# Patient Record
Sex: FEMALE | Race: WHITE | Employment: PART TIME | URBAN - METROPOLITAN AREA
[De-identification: names, ages, dates, MRNs, and addresses within clinical notes are randomized per-mention and may not be internally consistent; named-entity substitution may affect disease eponyms.]

---

## 2018-01-24 ENCOUNTER — ANESTHESIA EVENT (OUTPATIENT)
Dept: ENDOSCOPY | Age: 43
End: 2018-01-24
Payer: MEDICAID

## 2018-01-24 ENCOUNTER — HOSPITAL ENCOUNTER (OUTPATIENT)
Age: 43
Setting detail: OUTPATIENT SURGERY
Discharge: HOME | End: 2018-01-24
Attending: INTERNAL MEDICINE | Admitting: INTERNAL MEDICINE
Payer: MEDICAID

## 2018-01-24 ENCOUNTER — ANESTHESIA (OUTPATIENT)
Dept: ENDOSCOPY | Age: 43
End: 2018-01-24
Payer: MEDICAID

## 2018-01-24 VITALS
SYSTOLIC BLOOD PRESSURE: 113 MMHG | WEIGHT: 122.13 LBS | DIASTOLIC BLOOD PRESSURE: 69 MMHG | TEMPERATURE: 98 F | RESPIRATION RATE: 11 BRPM | HEIGHT: 62 IN | HEART RATE: 55 BPM | OXYGEN SATURATION: 100 % | BODY MASS INDEX: 22.48 KG/M2

## 2018-01-24 LAB — HCG UR QL: NEGATIVE

## 2018-01-24 PROCEDURE — 88305 TISSUE EXAM BY PATHOLOGIST: CPT | Performed by: INTERNAL MEDICINE

## 2018-01-24 PROCEDURE — 74011250636 HC RX REV CODE- 250/636

## 2018-01-24 PROCEDURE — 76040000019: Performed by: INTERNAL MEDICINE

## 2018-01-24 PROCEDURE — 76060000031 HC ANESTHESIA FIRST 0.5 HR: Performed by: INTERNAL MEDICINE

## 2018-01-24 PROCEDURE — 81025 URINE PREGNANCY TEST: CPT

## 2018-01-24 PROCEDURE — 74011000250 HC RX REV CODE- 250

## 2018-01-24 PROCEDURE — 77030009426 HC FCPS BIOP ENDOSC BSC -B: Performed by: INTERNAL MEDICINE

## 2018-01-24 PROCEDURE — 77030027957 HC TBNG IRR ENDOGTR BUSS -B: Performed by: INTERNAL MEDICINE

## 2018-01-24 RX ORDER — SODIUM CHLORIDE 0.9 % (FLUSH) 0.9 %
5-10 SYRINGE (ML) INJECTION EVERY 8 HOURS
Status: DISCONTINUED | OUTPATIENT
Start: 2018-01-24 | End: 2018-01-24 | Stop reason: HOSPADM

## 2018-01-24 RX ORDER — DEXTROMETHORPHAN/PSEUDOEPHED 2.5-7.5/.8
1.2 DROPS ORAL
Status: DISCONTINUED | OUTPATIENT
Start: 2018-01-24 | End: 2018-01-24 | Stop reason: HOSPADM

## 2018-01-24 RX ORDER — FENTANYL CITRATE 50 UG/ML
100 INJECTION, SOLUTION INTRAMUSCULAR; INTRAVENOUS
Status: DISCONTINUED | OUTPATIENT
Start: 2018-01-24 | End: 2018-01-24 | Stop reason: HOSPADM

## 2018-01-24 RX ORDER — MIDAZOLAM HYDROCHLORIDE 1 MG/ML
.25-5 INJECTION, SOLUTION INTRAMUSCULAR; INTRAVENOUS
Status: DISCONTINUED | OUTPATIENT
Start: 2018-01-24 | End: 2018-01-24 | Stop reason: HOSPADM

## 2018-01-24 RX ORDER — SODIUM CHLORIDE 0.9 % (FLUSH) 0.9 %
5-10 SYRINGE (ML) INJECTION AS NEEDED
Status: DISCONTINUED | OUTPATIENT
Start: 2018-01-24 | End: 2018-01-24 | Stop reason: HOSPADM

## 2018-01-24 RX ORDER — EPINEPHRINE 0.1 MG/ML
1 INJECTION INTRACARDIAC; INTRAVENOUS
Status: DISCONTINUED | OUTPATIENT
Start: 2018-01-24 | End: 2018-01-24 | Stop reason: HOSPADM

## 2018-01-24 RX ORDER — ATROPINE SULFATE 0.1 MG/ML
0.5 INJECTION INTRAVENOUS
Status: DISCONTINUED | OUTPATIENT
Start: 2018-01-24 | End: 2018-01-24 | Stop reason: HOSPADM

## 2018-01-24 RX ORDER — SODIUM CHLORIDE 9 MG/ML
INJECTION, SOLUTION INTRAVENOUS
Status: DISCONTINUED | OUTPATIENT
Start: 2018-01-24 | End: 2018-01-24 | Stop reason: HOSPADM

## 2018-01-24 RX ORDER — NALOXONE HYDROCHLORIDE 0.4 MG/ML
0.4 INJECTION, SOLUTION INTRAMUSCULAR; INTRAVENOUS; SUBCUTANEOUS
Status: DISCONTINUED | OUTPATIENT
Start: 2018-01-24 | End: 2018-01-24 | Stop reason: HOSPADM

## 2018-01-24 RX ORDER — FLUMAZENIL 0.1 MG/ML
0.2 INJECTION INTRAVENOUS
Status: DISCONTINUED | OUTPATIENT
Start: 2018-01-24 | End: 2018-01-24 | Stop reason: HOSPADM

## 2018-01-24 RX ORDER — PROPOFOL 10 MG/ML
INJECTION, EMULSION INTRAVENOUS AS NEEDED
Status: DISCONTINUED | OUTPATIENT
Start: 2018-01-24 | End: 2018-01-24 | Stop reason: HOSPADM

## 2018-01-24 RX ORDER — LIDOCAINE HYDROCHLORIDE 20 MG/ML
INJECTION, SOLUTION EPIDURAL; INFILTRATION; INTRACAUDAL; PERINEURAL AS NEEDED
Status: DISCONTINUED | OUTPATIENT
Start: 2018-01-24 | End: 2018-01-24 | Stop reason: HOSPADM

## 2018-01-24 RX ORDER — SODIUM CHLORIDE 9 MG/ML
50 INJECTION, SOLUTION INTRAVENOUS CONTINUOUS
Status: DISCONTINUED | OUTPATIENT
Start: 2018-01-24 | End: 2018-01-24 | Stop reason: HOSPADM

## 2018-01-24 RX ORDER — LANOLIN ALCOHOL/MO/W.PET/CERES
CREAM (GRAM) TOPICAL
COMMUNITY

## 2018-01-24 RX ADMIN — PROPOFOL 20 MG: 10 INJECTION, EMULSION INTRAVENOUS at 08:48

## 2018-01-24 RX ADMIN — LIDOCAINE HYDROCHLORIDE 60 MG: 20 INJECTION, SOLUTION EPIDURAL; INFILTRATION; INTRACAUDAL; PERINEURAL at 08:43

## 2018-01-24 RX ADMIN — PROPOFOL 50 MG: 10 INJECTION, EMULSION INTRAVENOUS at 08:53

## 2018-01-24 RX ADMIN — PROPOFOL 20 MG: 10 INJECTION, EMULSION INTRAVENOUS at 08:51

## 2018-01-24 RX ADMIN — PROPOFOL 20 MG: 10 INJECTION, EMULSION INTRAVENOUS at 08:47

## 2018-01-24 RX ADMIN — PROPOFOL 20 MG: 10 INJECTION, EMULSION INTRAVENOUS at 09:03

## 2018-01-24 RX ADMIN — PROPOFOL 30 MG: 10 INJECTION, EMULSION INTRAVENOUS at 09:00

## 2018-01-24 RX ADMIN — PROPOFOL 50 MG: 10 INJECTION, EMULSION INTRAVENOUS at 08:46

## 2018-01-24 RX ADMIN — PROPOFOL 40 MG: 10 INJECTION, EMULSION INTRAVENOUS at 08:54

## 2018-01-24 RX ADMIN — PROPOFOL 40 MG: 10 INJECTION, EMULSION INTRAVENOUS at 09:07

## 2018-01-24 RX ADMIN — SODIUM CHLORIDE: 9 INJECTION, SOLUTION INTRAVENOUS at 08:31

## 2018-01-24 NOTE — ROUTINE PROCESS
Summer A Rochelle Muhammad  1975  [id number]    Situation:  Verbal report received from: Maylin Keller RN  Procedure: Procedure(s):  EGD/COLONOSCOPY  COLONOSCOPY  ESOPHAGOGASTRODUODENAL (EGD) BIOPSY    Background:    Preoperative diagnosis:  ANEMIA  Postoperative diagnosis: 1.- Normal EGD  2.- Normal Colonoscopy    :  Dr. Jagdeep Taylor  Assistant(s): Endoscopy Technician-1: Dee Dee Islas  Endoscopy RN-1: Srinivas Walker RN    Specimens:   ID Type Source Tests Collected by Time Destination   1 : Duodenal Biopsy  Preservative   Neha Myers MD 1/24/2018 2983 Pathology     H. Pylori  no    Assessment:  Intra-procedure medications       Anesthesia gave intra-procedure sedation and medications, see anesthesia flow sheet yes    Intravenous fluids: NS@ KVO     Vital signs stable     Abdominal assessment: round and soft     Recommendation:  Discharge patient per MD order  Family or Friend  Pt friend  Permission to share finding with family or friend yes

## 2018-01-24 NOTE — ANESTHESIA POSTPROCEDURE EVALUATION
Post-Anesthesia Evaluation and Assessment    Patient: Mark Prince MRN: [id number]  SSN: [id number]    YOB: 1975  Age: 40 y.o. Sex: female       Cardiovascular Function/Vital Signs  Visit Vitals    BP (!) 77/31    Pulse (!) 59    Temp 36.8 °C (98.2 °F)    Resp 22    Ht 5' 2\" (1.575 m)    Wt 55.4 kg (122 lb 2 oz)    SpO2 100%    Breastfeeding No    BMI 22.34 kg/m2       Patient is status post MAC anesthesia for Procedure(s):  EGD/COLONOSCOPY  COLONOSCOPY  ESOPHAGOGASTRODUODENAL (EGD) BIOPSY. Nausea/Vomiting: None    Postoperative hydration reviewed and adequate. Pain:  Pain Scale 1: Numeric (0 - 10) (01/24/18 5456)  Pain Intensity 1: 0 (01/24/18 7670)   Managed    Neurological Status: At baseline    Mental Status and Level of Consciousness: Arousable    Pulmonary Status:   O2 Device: Nasal cannula (01/24/18 0910)   Adequate oxygenation and airway patent    Complications related to anesthesia: None    Post-anesthesia assessment completed.  No concerns    Signed By: Misael Breaux MD     January 24, 2018

## 2018-01-24 NOTE — PROCEDURES
[address], [address] [place] [address]              :  Rogelio Izquierdo MD    Referring Provider: Not On File Bsi    Sedation:  MAC anesthesia Propofol        Prior to the procedure its objectives, risks, consequences and alternatives were discussed with the patient who then elected to proceed. The patient had the opportunity to ask questions and those questions were answered. A physical exam was performed. The heart, lungs, and mental status were examined prior to the procedure and found to be satisfactory for conscious sedation and for the procedure. Conscious sedation was initiated by the physician. Continuous pulse oximetry and blood pressure monitoring were used throughout the procedure. After appropriate analgesia and rectal exam, the colonoscope was passed into the anus and passed to the cecum without difficulty. The prep was good. On slow withdrawal of the scope, the cecum, ascending colon, hepatic flexure, transverse colon, splenic flexure, descending colon, sigmoid and rectum were normal. Retroflexion exam of the rectum was normal. She tolerated the procedure without complication and I recommend a repeat colonoscopy in 10 years. Specimen none    Complications: None. EBL:  None.     Rogelio Izquierdo MD  1/24/2018  9:20 AM

## 2018-01-24 NOTE — PROCEDURES
26[address] [facility]  [address]               : Jaciel Schuster MD    Referring Provider: Not On File Bsi    Sedation:  MAC anesthesia Propofol      Prior to the procedure its objectives, risks, consequences and alternatives were discussed with the patient who then elected to proceed. The patient had the opportunity to ask questions and those questions were answered. A physical exam was performed. The heart, lungs, and mental status were examined prior to the procedure and found to be satisfactory for conscious sedation and for the procedure. Conscious sedation was initiated by the physician. Continuous pulse oximetry and blood pressure monitoring were used throughout the procedure. After appropriate pharyngeal anesthesia, the endoscope was passed into the esophagus without difficulty. The proximal esophagus is normal as is the distal esophagus. The scope was passed into the stomach without difficulty. The fundus, body, antrum, pylorus, bulb and postbulbar area are unremarkable. Biopsies were obtained from the second portion of the duodenum to rule out celiac disease. On slow withdrawal of the scope, no abnormality was noted. Retroflexion revealed normal cardia and fundus. She tolerated the procedure without complications and will be discharged later today. Specimen Biopsies of duodenum    Complications: None. EBL:  None.     Jaciel Schuster MD  1/24/2018  9:18 AM

## 2018-01-24 NOTE — IP AVS SNAPSHOT
[address] 
[phone] Patient: Ray Tirado MRN: [id number] OIN:0/79/4277 About your hospitalization You were admitted on:  January 24, 2018 You last received care in the:  [facility] ENDOSCOPY You were discharged on:  January 24, 2018 Why you were hospitalized Your primary diagnosis was:  Not on File Follow-up Information None Discharge Orders None A check ana indicates which time of day the medication should be taken. My Medications CONTINUE taking these medications Instructions Each Dose to Equal  
 Morning Noon Evening Bedtime Iron 325 mg (65 mg iron) tablet Generic drug:  ferrous sulfate Your last dose was: Your next dose is: Take  by mouth Daily (before breakfast). Discharge Instructions Pretty Nicholson. 
       
   
   DISCHARGE INSTRUCTIONS Eliana Jeyson Arguetaler 
[id number] 1975 DISCOMFORT: 
Redness at IV site- apply warm compress to area; if redness or soreness persist- contact your physician There may be a slight amount of blood passed from the rectum Gaseous discomfort- walking, belching will help relieve any discomfort You may not operate a vehicle for 12 hours You may not  engage in an occupation involving machinery or appliances for rest of today You may not  drink alcoholic beverages for at least 12 hours Avoid making any critical decisions for at least 24 hour DIET: 
 High fiber diet.  however -  remember your colon is empty and a heavy meal will produce gas. Avoid these foods:  vegetables, fried / greasy foods, carbonated drinks for today ACTIVITY You may resume your normal daily activities Spend the remainder of the day resting -  avoid any strenuous activity. CALL M.D. ANY SIGN OF Increasing pain, nausea, vomiting Abdominal distension (swelling) New increased bleeding (oral or rectal) Fever (chills) Pain in chest area Bloody discharge from nose or mouth Shortness of breath ENDOSCOPY FINDINGS: 
 Your endoscopy and colonoscopy were normal. Biopsies were done to rule out celiac disease. You may not  take any Advil, Aspirin, Ibuprofen, Motrin, Aleve, or Goodys for 10 days, ONLY  Tylenol as needed for pain. Post procedure diagnosis:  1.- Normal EGD 
2.- Normal Colonoscopy Follow-up Instructions: 
 Call Dr. Megha Vieyra for any questions or problems. If we took a biopsy please call the office within 2 weeks to discuss your                             pathology results. Telephone # [phone] Introducing [facility] SERVICES! Tamara Haywood introduces [employer] patient portal. Now you can access parts of your medical record, email your doctor's office, and request medication refills online. 1. In your internet browser, go to [email or web address] 2. Click on the First Time User? Click Here link in the Sign In box. You will see the New Member Sign Up page. 3. Enter your [facility] Access Code exactly as it appears below. You will not need to use this code after youve completed the sign-up process. If you do not sign up before the expiration date, you must request a new code. · [facility] Access Code: [email or web address] Expires: 4/24/2018  9:23 AM 
 
4. Enter the last four digits of your Social Security Number (xxxx) and Date of Birth (mm/dd/yyyy) as indicated and click Submit. You will be taken to the next sign-up page. 5. Create a [facility] ID. This will be your [facility]mit login ID and cannot be changed, so think of one that is secure and easy to remember. 6. Create a [facility] password. You can change your password at any time. 7. Enter your Password Reset Question and Answer. This can be used at a later time if you forget your password. 8. Enter your e-mail address. You will receive e-mail notification when new information is available in [address] 9. Click Sign Up. You can now view and download portions of your medical record. 10. Click the Download Summary menu link to download a portable copy of your medical information. If you have questions, please visit the Frequently Asked Questions section of the [employer] website. Remember, [employer] is NOT to be used for urgent needs. For medical emergencies, dial 911. Now available from your iPhone and Android! Providers Seen During Your Hospitalization Provider Specialty Primary office phone Jeb Ortiz MD Gastroenterology [phone] Your Primary Care Physician (PCP) Primary Care Physician Office Phone Office Fax NOT ON FILE ** None ** ** None ** You are allergic to the following No active allergies Recent Documentation Height Weight Breastfeeding? BMI Smoking Status 1.575 m 55.4 kg No 22.34 kg/m2 Former Smoker Emergency Contacts Name Discharge Info Relation Home Work Mobile Deleta Librado DECLINED CAREGIVER [4] Boyfriend [17] [phone] Patient Belongings The following personal items are in your possession at time of discharge: 
  Dental Appliances: None  Visual Aid: Other (comment) Please provide this summary of care documentation to your next provider. Signatures-by signing, you are acknowledging that this After Visit Summary has been reviewed with you and you have received a copy. Patient Signature:  ____________________________________________________________ Date:  ____________________________________________________________  
  
Mary Singh Provider Signature:  ____________________________________________________________ Date:  ____________________________________________________________

## 2018-01-24 NOTE — IP AVS SNAPSHOT
Summary of Care Report The Summary of Care report has been created to help improve care coordination. Users with access to [employer] or [address] (Web-based application) may access additional patient information including the Discharge Summary. If you are not currently a [address] user and need more information, please call the number listed below in the [place] [address] section and ask to be connected with Medical Records. Facility Information Name Address Phone [address] [phone] [phone] Patient Information Patient Name Sex ZAKIYA De La Fuente ([id number]) Female 1975 Discharge Information Admitting Provider Service Area Unit Rogelio Izquierdo MD / [facility] Endoscopy / [phone] Discharge Provider Discharge Date/Time Discharge Disposition Destination (none) (none) (none) (none) Patient Language Language ENGLISH [13] You are allergic to the following No active allergies Current Discharge Medication List  
  
CONTINUE these medications which have NOT CHANGED Dose & Instructions Dispensing Information Comments Iron 325 mg (65 mg iron) tablet Generic drug:  ferrous sulfate Take  by mouth Daily (before breakfast). Refills:  0 Surgery Information ID Date/Time Status Primary Surgeon All Procedures Location [id number] 2018 0830 Unposted Rogelio Izquierdo MD EGD/COLONOSCOPY 
COLONOSCOPY 
ESOPHAGOGASTRODUODENAL (EGD) BIOPSY [facility] ENDOSCOPY Follow-up Information None Discharge Instructions [address] 
       
   
   DISCHARGE INSTRUCTIONS Summer Gale Pineda 
[id number] 1975 DISCOMFORT: 
Redness at IV site- apply warm compress to area; if redness or soreness persist- contact your physician There may be a slight amount of blood passed from the rectum Gaseous discomfort- walking, belching will help relieve any discomfort You may not operate a vehicle for 12 hours You may not  engage in an occupation involving machinery or appliances for rest of today You may not  drink alcoholic beverages for at least 12 hours Avoid making any critical decisions for at least 24 hour DIET: 
 High fiber diet.  however -  remember your colon is empty and a heavy meal will produce gas. Avoid these foods:  vegetables, fried / greasy foods, carbonated drinks for today ACTIVITY You may resume your normal daily activities Spend the remainder of the day resting -  avoid any strenuous activity. CALL M.D. ANY SIGN OF Increasing pain, nausea, vomiting Abdominal distension (swelling) New increased bleeding (oral or rectal) Fever (chills) Pain in chest area Bloody discharge from nose or mouth Shortness of breath ENDOSCOPY FINDINGS: 
 Your endoscopy and colonoscopy were normal. Biopsies were done to rule out celiac disease. You may not  take any Advil, Aspirin, Ibuprofen, Motrin, Aleve, or Goodys for 10 days, ONLY  Tylenol as needed for pain. Post procedure diagnosis:  1.- Normal EGD 
2.- Normal Colonoscopy Follow-up Instructions: 
 Call Dr. Reji Lee for any questions or problems. If we took a biopsy please call the office within 2 weeks to discuss your                             pathology results. Telephone # [phone] Chart Review Routing History No Routing History on File

## 2018-01-24 NOTE — H&P
[address] [address] Steven Filippo Luna is a  40 y.o.  female who presents with severe iron deficiency anemia .         Past Medical History:   Diagnosis Date    Asthma     As a child    Ill-defined condition     Migraines, Anemia     Past Surgical History:   Procedure Laterality Date    HX GYN      C Section x 1     No Known Allergies  Current Facility-Administered Medications   Medication Dose Route Frequency Provider Last Rate Last Dose    0.9% sodium chloride infusion  50 mL/hr IntraVENous CONTINUOUS Wallace MD Lyndsey        sodium chloride (NS) flush 5-10 mL  5-10 mL IntraVENous Q8H Adelaida NipMD michael        sodium chloride (NS) flush 5-10 mL  5-10 mL IntraVENous PRN Adelaida MD Lynsdey        midazolam (VERSED) injection 0.25-5 mg  0.25-5 mg IntraVENous Multiple Wallace Nipple, MD        fentaNYL citrate (PF) injection 100 mcg  100 mcg IntraVENous Multiple Wallace Nipple, MD        [facility]) injection 0.4 mg  0.4 mg IntraVENous Multiple Adelaida NipMD michael        flumazenil (ROMAZICON) 0.1 mg/mL injection 0.2 mg  0.2 mg IntraVENous Multiple Adelaida Nipple, MD        [facility]) 40RT/3.1OW oral drops 80 mg  1.2 mL Oral Multiple Wallace Nipmichael, MD        atropine injection 0.5 mg  0.5 mg IntraVENous ONCE PRN Wallace Nipple, MD        EPINEPHrine (ADRENALIN) 0.1 mg/mL syringe 1 mg  1 mg Endoscopically ONCE PRN Wallace MD Lyndsey         Facility-Administered Medications Ordered in Other Encounters   Medication Dose Route Frequency Provider Last Rate Last Dose    0.9% sodium chloride infusion   IntraVENous CONTINUOUS Evangelista Manifold, CRNA        lidocaine (PF) (XYLOCAINE) 20 mg/mL (2 %) injection   IntraVENous PRN Evangelista Manifold, CRNA   60 mg at 01/24/18 0843       Visit Vitals    BP 98/53    Pulse 60    Temp 98.2 °F (36.8 °C)    Resp 16    Ht 5' 2\" (1.575 m)    Wt 55.4 kg (122 lb 2 oz)    SpO2 100%    Breastfeeding No    BMI 22.34 kg/m2           PHYSICAL EXAM:  General: WD, WN. Alert, cooperative, no acute distress    HEENT: NC, Atraumatic. PERRLA, EOMI. Anicteric sclerae. Mallampati score 2  Lungs:  CTA Bilaterally. No Wheezing/Rhonchi/Rales. Heart:  Regular  rhythm,  No murmur (), No Rubs, No Gallops  Abdomen: Soft, Non distended, Non tender.  +Bowel sounds, no HSM  Extremities: No c/c/e  Neurologic:  CN 2-12 gi, Alert and oriented X 3. No acute neurological distress   Psych:   Good insight. Not anxious nor agitated. Plan:   Endoscopic procedure with MAC.     Gomez Iyer MD  1/24/2018  8:46 AM

## 2018-01-24 NOTE — DISCHARGE INSTRUCTIONS
[address] [facility]  [id number]  1975    DISCOMFORT:  Redness at IV site- apply warm compress to area; if redness or soreness persist- contact your physician  There may be a slight amount of blood passed from the rectum  Gaseous discomfort- walking, belching will help relieve any discomfort  You may not operate a vehicle for 12 hours  You may not  engage in an occupation involving machinery or appliances for rest of today  You may not  drink alcoholic beverages for at least 12 hours  Avoid making any critical decisions for at least 24 hour    DIET:   High fiber diet. - however -  remember your colon is empty and a heavy meal will produce gas. Avoid these foods:  vegetables, fried / greasy foods, carbonated drinks for today      ACTIVITY  You may resume your normal daily activities   Spend the remainder of the day resting -  avoid any strenuous activity. CALL M.D. ANY SIGN OF   Increasing pain, nausea, vomiting  Abdominal distension (swelling)  New increased bleeding (oral or rectal)  Fever (chills)  Pain in chest area  Bloody discharge from nose or mouth  Shortness of breath        ENDOSCOPY FINDINGS:   Your endoscopy and colonoscopy were normal. Biopsies were done to rule out celiac disease. You may not  take any Advil, Aspirin, Ibuprofen, Motrin, Aleve, or Goodys for 10 days, ONLY  Tylenol as needed for pain. Post procedure diagnosis:  1.- Normal EGD  2.- Normal Colonoscopy    Follow-up Instructions:   Call Dr. Deshawn Rick for any questions or problems. If we took a biopsy please call the office within 2 weeks to discuss your                             pathology results.  Telephone # [phone]

## (undated) DEVICE — FORCEPS BX L240CM JAW DIA2.8MM L CAP W/ NDL MIC MESH TOOTH

## (undated) DEVICE — BAG SPEC BIOHZD LF 2MIL 6X10IN -- CONVERT TO ITEM 357326

## (undated) DEVICE — Z DISCONTINUED USE 2751540 TUBING IRRIG L10IN DISP PMP ENDOGATOR

## (undated) DEVICE — KIT IV STRT W CHLORAPREP PD 1ML

## (undated) DEVICE — BITE BLK ENDOSCP AD 54FR GRN POLYETH ENDOSCP W STRP SLD

## (undated) DEVICE — 1200 GUARD II KIT W/5MM TUBE W/O VAC TUBE: Brand: GUARDIAN

## (undated) DEVICE — SYRINGE MED 20ML STD CLR PLAS LUERLOCK TIP N CTRL DISP

## (undated) DEVICE — NEONATAL-ADULT SPO2 SENSOR: Brand: NELLCOR

## (undated) DEVICE — CONNECTOR TBNG AUX H2O JET DISP FOR OLY 160/180 SER

## (undated) DEVICE — ENDO CARRY-ON PROCEDURE KIT INCLUDES ENZYMATIC SPONGE, GAUZE, BIOHAZARD LABEL, TRAY, LUBRICANT, DIRTY SCOPE LABEL, WATER LABEL, TRAY, DRAWSTRING PAD, AND DEFENDO 4-PIECE KIT.: Brand: ENDO CARRY-ON PROCEDURE KIT

## (undated) DEVICE — BAG BELONG PT PERS CLEAR HANDL

## (undated) DEVICE — Device

## (undated) DEVICE — BW-412T DISP COMBO CLEANING BRUSH: Brand: SINGLE USE COMBINATION CLEANING BRUSH

## (undated) DEVICE — AIRLIFE™ U/CONNECT-IT OXYGEN TUBING 7 FEET (2.1 M) CRUSH-RESISTANT OXYGEN TUBING, VINYL TIPPED: Brand: AIRLIFE™

## (undated) DEVICE — Z DISCONTINUED NO SUB IDED SET EXTN W/ 4 W STPCOCK M SPIN LOK 36IN

## (undated) DEVICE — SOLIDIFIER FLUID 3000 CC ABSORB

## (undated) DEVICE — CONTAINER SPEC 20 ML LID NEUT BUFF FORMALIN 10 % POLYPR STS

## (undated) DEVICE — CANN NASAL O2 CAPNOGRAPHY AD -- FILTERLINE

## (undated) DEVICE — KENDALL RADIOLUCENT FOAM MONITORING ELECTRODE -RECTANGULAR SHAPE: Brand: KENDALL

## (undated) DEVICE — SET ADMIN 16ML TBNG L100IN 2 Y INJ SITE IV PIGGY BK DISP

## (undated) DEVICE — CATH IV AUTOGRD BC BLU 22GA 25 -- INSYTE

## (undated) DEVICE — QUILTED PREMIUM COMFORT UNDERPAD,EXTRA HEAVY: Brand: WINGS

## (undated) DEVICE — NEEDLE HYPO 18GA L1.5IN PNK S STL HUB POLYPR SHLD REG BVL